# Patient Record
Sex: MALE
[De-identification: names, ages, dates, MRNs, and addresses within clinical notes are randomized per-mention and may not be internally consistent; named-entity substitution may affect disease eponyms.]

---

## 2019-10-14 ENCOUNTER — HOSPITAL ENCOUNTER (EMERGENCY)
Dept: HOSPITAL 39 - ER | Age: 59
Discharge: HOME | End: 2019-10-14
Payer: COMMERCIAL

## 2019-10-14 VITALS — OXYGEN SATURATION: 96 % | DIASTOLIC BLOOD PRESSURE: 77 MMHG | SYSTOLIC BLOOD PRESSURE: 127 MMHG | TEMPERATURE: 97.1 F

## 2019-10-14 DIAGNOSIS — S09.90XA: ICD-10-CM
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
## 2019-10-14 DIAGNOSIS — S01.01XA: Primary | ICD-10-CM
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
## 2019-10-14 NOTE — ED.PDOC
History of Present Illness





- General


Chief Complaint: Laceration


Stated Complaint: laceration to back of scalp


Time Seen by Provider: 10/14/19 06:04





- History of Present Illness


Initial Comments: 





pt fell down this morning and hit his head , has big laceration and some 

bleeding 


Occurred: this morning


Pain Location: head


Method of Injury: fall


Improving Factors: nothing


Worsening Factors: nothing


Loss of Consciousness: no loss of consciousness


Associated Symptoms (Fall): denies symptoms


Allergies/Adverse Reactions: 


Allergies





NO KNOWN ALLERGY Allergy (Verified 10/14/19 06:16)


   





Home Medications: 


Ambulatory Orders





Sulfamethoxazole-Trimethoprim [Bactrim Ds 800-160 mg] 1 tab PO BID #20 tab 

10/14/19 


Tramadol HCl 50 mg PO BID #6 tab 10/14/19 











Review of Systems





- Review of Systems


Constitutional: States: no symptoms reported


EENTM: States: no symptoms reported


Respiratory: States: no symptoms reported


Cardiology: States: no symptoms reported


Gastrointestinal/Abdominal: States: no symptoms reported


Genitourinary: States: no symptoms reported


Musculoskeletal: States: no symptoms reported


Neurological: States: see HPI


Endocrine: States: no symptoms reported





Past Medical History (General)





- Patient Medical History


Hx Seizures: No


Hx Stroke: No


Hx Dementia: No


Hx Asthma: No


Hx of COPD: No


Hx Cardiac Disorders: No


Hx Congestive Heart Failure: No


Hx Pacemaker: No


Hx Hypertension: No


Hx Thyroid Disease: No


Hx Diabetes: No


Hx Gastroesophageal Reflux: No


Hx Renal Disease: No


Hx Cancer: No


Hx Hepatitis C: No


Surgical History: no surgical history





- Vaccination History


Hx Tetanus, Diphtheria Vaccination: No


Hx Influenza Vaccination: No


Immunizations Up to Date: No





- Social History


Hx Tobacco Use: No


Hx Alcohol Use: No





- Triage Comment


ED Triage Comment: Patient states he slipped in shower, fell and hit his head. 

Has laceration to back of head.





Family Medical History





- Family History


  ** Mother


Family History: Unknown


Living Status: Unknown





Physical Exam





- Physical Exam


General Appearance: Alert


Head Injury: lacerations


Eye Exam: bilateral normal


ENT Exam: hearing grossly normal, no evidence of ENT injury


Neck Exam: non-tender, full range of motion, normal alignment, normal inspection


Cardiovascular/Respiratory: regular rate, rhythm, no respiratory distress


Gastrointestinal/Abdominal: normal bowel sounds, non tender


Back Exam: normal inspection, no CVA tenderness


Extremity Exam: no evidence of injury, normal range of motion


Neurologic: no motor/sensory deficits, alert, normal mood/affect, oriented x 3


Skin Exam: normal color





- Akhil Coma Score


Best Eye Response ([place]): (4) open spontaneously


Best Verbal Response (Akhil): (5) oriented


Best Motor Response ([place]): (6) obeys commands





Progress





- Progress


Progress: 





10/14/19 07:04


                               Laboratory Results











WBC  6.6 K/mm3 (4.8-10.8)   10/14/19  06:15    


 


RBC  4.85 M/mm3 (4.70-6.10)   10/14/19  06:15    


 


Hgb  15.9 gm/dL (14.0-18.0)   10/14/19  06:15    


 


Hct  45.6 % (42.0-52.0)   10/14/19  06:15    


 


MCV  93.9 fl (80.0-94.0)   10/14/19  06:15    


 


MCH  32.7 pg (27.0-31.0)  H  10/14/19  06:15    


 


MCHC  34.9 g/dL (33.0-37.0)   10/14/19  06:15    


 


RDW  13.3 % (11.5-14.5)   10/14/19  06:15    


 


Plt Count  171 K/mm3 (130-400)   10/14/19  06:15    


 


MPV  8.6 fl (7.40-10.4)   10/14/19  06:15    


 


Absolute Neuts (auto)  4.10 K/uL (1.8-6.8)   10/14/19  06:15    


 


Absolute Lymphs (auto)  1.60 K/uL (1.0-3.4)   10/14/19  06:15    


 


Absolute Monos (auto)  0.50 K/uL (0.2-0.8)   10/14/19  06:15    


 


Absolute Eos (auto)  0.30 K/uL (0.0-0.4)   10/14/19  06:15    


 


Absolute Basos (auto)  0.10 K/uL (0.0-0.1)   10/14/19  06:15    


 


Neutrophils %  62.2 % (42.0-78.0)   10/14/19  06:15    


 


Lymphocytes %  24.0 % (20.0-50.0)   10/14/19  06:15    


 


Monocytes %  8.3 % (2.0-9.0)   10/14/19  06:15    


 


Eosinophils %  4.0 % (1.0-5.0)   10/14/19  06:15    


 


Basophils %  1.5 % (0.0-2.0)   10/14/19  06:15    


 


Sodium  139 mmol/L (135-145)   10/14/19  06:15    


 


Potassium  3.9 mmol/L (3.6-5.0)   10/14/19  06:15    


 


Chloride  106 mmol/L (101-111)   10/14/19  06:15    


 


Carbon Dioxide  21 mmol/L (21-31)   10/14/19  06:15    


 


Anion Gap  15.9  (12-18)   10/14/19  06:15    


 


BUN  11 mg/dL (7-18)   10/14/19  06:15    


 


Creatinine  0.72 mg/dL (0.6-1.3)   10/14/19  06:15    


 


BUN/Creatinine Ratio  15.3  (10-20)   10/14/19  06:15    


 


Random Glucose  131 mg/dL ()  H  10/14/19  06:15    


 


Serum Osmolality  278.7 mOsm/L (275-295)   10/14/19  06:15    


 


Calcium  9.8 mg/dL (8.4-10.2)   10/14/19  06:15    


 


Total Bilirubin  1.0 mg/dL (0.2-1.0)   10/14/19  06:15    


 


AST  85 IU/L (10-42)  H  10/14/19  06:15    


 


ALT  105 IU/L (10-60)  H  10/14/19  06:15    


 


Alkaline Phosphatase  82 IU/L ()   10/14/19  06:15    


 


Serum Total Protein  7.8 gm/dL (6.4-8.2)   10/14/19  06:15    


 


Albumin  4.1 g/dl (3.2-5.5)   10/14/19  06:15    


 


Globulin  3.7 gm/dL (2.3-3.5)  H  10/14/19  06:15    


 


Albumin/Globulin Ratio  1.1  (1.1-1.9)   10/14/19  06:15    














Departure





- Departure


Clinical Impression: 


 Fall, Head injury, Laceration, Accidental laceration





Disposition: Discharge to Home or Self Care


Departure Forms:  ED Discharge - Pt. Copy, Patient Portal Self Enrollment


Instructions:  DI for Laceration Repair


Diet: resume usual diet


Activity: increase activity as tolerated, walking as tolerated


Prescriptions: 


Sulfamethoxazole-Trimethoprim [Bactrim Ds 800-160 mg] 1 tab PO BID #20 tab


Home Medications: 


Ambulatory Orders





Sulfamethoxazole-Trimethoprim [Bactrim Ds 800-160 mg] 1 tab PO BID #20 tab 

10/14/19 


Tramadol HCl 50 mg PO BID #6 tab 10/14/19 








Additional Instructions: 


Follow up PCP in 1-2 weeks

## 2019-10-14 NOTE — CT
EXAM:

  CT Head Without Intravenous Contrast



CLINICAL HISTORY:

  The patient is 59 years old and is Male; trauma



TECHNIQUE:

  Axial computed tomography images of the head/brain without

intravenous contrast.  Sagittal and coronal reformatted images

were created and reviewed.  This CT exam was performed using one

or more of the following dose reduction techniques:  automated

exposure control, adjustment of the mA and/or kV according to

patient size, and/or use of iterative reconstruction technique.



COMPARISON:

  No relevant prior studies available.



FINDINGS:

  BRAIN:  Unremarkable.  The gray-white matter differentiation is

preserved . No hemorrhage.  No significant white matter disease. 

No edema. No extra-axial fluid collections.

  VENTRICLES:  Unremarkable.  No ventriculomegaly.

  BONES/JOINTS:  No acute fracture.

  SOFT TISSUES:  Left parietal scalp hematoma is present.

  SINUSES:  Unremarkable as visualized.  No acute sinusitis.

  MASTOID AIR CELLS:  Unremarkable as visualized.  No mastoid

effusion.

  ORBITS:  Unremarkable as visualized.



IMPRESSION:     

  No acute intracranial findings. Left parietal scalp hematoma.



Electronically signed by:  Charissa Sheehan MD  10/14/2019 6:27 AM

CDT Workstation: [id number]